# Patient Record
Sex: FEMALE | Race: WHITE | NOT HISPANIC OR LATINO
[De-identification: names, ages, dates, MRNs, and addresses within clinical notes are randomized per-mention and may not be internally consistent; named-entity substitution may affect disease eponyms.]

---

## 2020-07-27 ENCOUNTER — TRANSCRIPTION ENCOUNTER (OUTPATIENT)
Age: 25
End: 2020-07-27

## 2023-06-22 ENCOUNTER — APPOINTMENT (OUTPATIENT)
Dept: GYNECOLOGIC ONCOLOGY | Facility: CLINIC | Age: 28
End: 2023-06-22
Payer: COMMERCIAL

## 2023-06-22 VITALS
BODY MASS INDEX: 21.53 KG/M2 | HEIGHT: 62 IN | SYSTOLIC BLOOD PRESSURE: 144 MMHG | HEART RATE: 86 BPM | WEIGHT: 117 LBS | DIASTOLIC BLOOD PRESSURE: 88 MMHG

## 2023-06-22 VITALS
HEART RATE: 86 BPM | SYSTOLIC BLOOD PRESSURE: 144 MMHG | HEIGHT: 62 IN | WEIGHT: 117 LBS | DIASTOLIC BLOOD PRESSURE: 89 MMHG | BODY MASS INDEX: 21.53 KG/M2

## 2023-06-22 DIAGNOSIS — B97.7 PAPILLOMAVIRUS AS THE CAUSE OF DISEASES CLASSIFIED ELSEWHERE: ICD-10-CM

## 2023-06-22 DIAGNOSIS — Z80.3 FAMILY HISTORY OF MALIGNANT NEOPLASM OF BREAST: ICD-10-CM

## 2023-06-22 DIAGNOSIS — Z86.03 PERSONAL HISTORY OF NEOPLASM OF UNCERTAIN BEHAVIOR: ICD-10-CM

## 2023-06-22 DIAGNOSIS — Z80.8 FAMILY HISTORY OF MALIGNANT NEOPLASM OF OTHER ORGANS OR SYSTEMS: ICD-10-CM

## 2023-06-22 PROCEDURE — 57454 BX/CURETT OF CERVIX W/SCOPE: CPT

## 2023-06-22 PROCEDURE — 99203 OFFICE O/P NEW LOW 30 MIN: CPT | Mod: 25

## 2023-06-22 RX ORDER — NORETHINDRONE ACETATE AND ETHINYL ESTRADIOL AND FERROUS FUMARATE 1MG-20(24)
1-20 KIT ORAL
Refills: 0 | Status: ACTIVE | COMMUNITY

## 2023-06-22 RX ORDER — LISDEXAMFETAMINE DIMESYLATE 10 MG/1
CAPSULE ORAL
Refills: 0 | Status: ACTIVE | COMMUNITY

## 2023-06-22 NOTE — PROCEDURE
[Colposcopy] : colposcopy [Abnormal Pap] : an abnormal pap smear [HPV high risk] : PCR positive for high risk HPV [Patient] : the patient [Written Consent] : written consent was obtained prior to the procedure and is detailed in the patient's record [Allergies Reviewed] : Allergies reviewed [SCJ Fully Visualized] : the squamocolumnar junction was fully visualized [Acetowhite ___ o'clock] : ascetowhite changes at the [unfilled] ~Uo'clock position [Atypical Vessels ___ o'clock] : atypical vessels at the [unfilled] ~Uo'clock position [Punctation ___ o'clock] : punctation at the [unfilled] ~Uo'clock position [No Abnormalities] : no abnormalities seen [Biopsies Taken: # ___] : [unfilled] biopsies taken of the cervix [ECC Done] : an Endocervical curettage was performed.  [Silver Nitrate] : silver nitrate [No Complications] : none [Tolerated Well] : the patient tolerated the procedure well [Post procedure instructions and information given] : post procedure instructions and information given and reviewed with patient. [FreeTextEntry9] : IVANIA [FreeTextEntry3] : 5 o'clock lesion is more raised and c/w low grade dysplasia [FreeTextEntry1] : The colposcopy was adequate and she tolerated it well. She has a WE lesion encompasing the T-zone with a separate WE, raised lesion at 5 that I biopsied. The rest looked like low grade dysplasia.

## 2023-06-22 NOTE — HISTORY OF PRESENT ILLNESS
[FreeTextEntry1] : Referred by (GYN) Dr. Mccord\par PCP: Dr. Bejarano\par \par Ms. MARTINEZ is a 27 year old  female, LMP 23, referred from Dr. Mccord, for an abnormal pap smear.\par \par - Pap- negative\par 2022- Pap- ASCUS, HRHPV (+)\par 2023- Pap-  ASCU-H, HRHPV (+)\par No Colposcopy was performed \par \par PMHx- ADD\par PSHx- n/a\par Family hx of cancer- mother with melanoma, paternal grandmother and paternal aunt breast cancer\par Social hx- never smoker, social alcohol, lives with partner, planning on children in the next 3 years, she did not get the Gardasil vaccine and she is never smoker\par \par Of note, she was treated for Chlamydia, 3 years ago. She denies abnormal bleeding.  She denies intermenstrual bleeding, and post coital bleeding.  She reports no pelvic pain or pressure.  She denies any other associated signs or symptoms.  She is here today to discuss further management.  Outside of the above paps she has not had a prior abnormality.\par \par HM\par Pap- see above \par Mammo- n/a\par Colonoscopy- n/a

## 2023-06-22 NOTE — LETTER BODY
[Dear  ___] : Dear  [unfilled], [I had the pleasure of evaluating your patient, [unfilled] for ___] : I had the pleasure of evaluating your patient, [unfilled] for [unfilled]. [Attached please find my note.] : Attached please find my note. [FreeTextEntry2] : As you may recall, she is a young 27 y.o. woman with an ASC-H pap. I did colpo and biopsy that I suspect is low grade. she will return to see me in 4 weeks to discuss plans.

## 2023-06-22 NOTE — PHYSICAL EXAM
[Chaperone Present] : A chaperone was present in the examining room during all aspects of the physical examination [Normal] : Femoral pulses: 2+ bilaterally [Absent] : CVAT: absent [Fully active, able to carry on all pre-disease performance without restriction] : Status 0 - Fully active, able to carry on all pre-disease performance without restriction [FreeTextEntry1] : Antionette [de-identified] : normal ectropion, see colpo note

## 2023-06-22 NOTE — ASSESSMENT
[FreeTextEntry1] : The natural course and the effect of HPV infection was discussed. We discussed the findings of her colposcopy.  We would proceed with an excisional procedure, only if high grade dysplasia was found on biopsy, excising the cervical dysplasia and exclude occult early invasive carcinoma.\par \par Advised the patient of the natural course and effect of high risk HPV infection including this development of cervical dysplasia.  We discussed cigarette smoking and how it relates to LGT dz.  We discussed ASCCP guidelines, and the Gardasil vaccine recommendation.  If low grade dysplasia is found on biopsy, repeat pap smear with co testing, will be recommended.  All questions answered

## 2023-06-28 ENCOUNTER — OUTPATIENT (OUTPATIENT)
Dept: OUTPATIENT SERVICES | Facility: HOSPITAL | Age: 28
LOS: 1 days | End: 2023-06-28
Payer: COMMERCIAL

## 2023-06-28 DIAGNOSIS — C80.1 MALIGNANT (PRIMARY) NEOPLASM, UNSPECIFIED: ICD-10-CM

## 2023-06-29 ENCOUNTER — RESULT REVIEW (OUTPATIENT)
Age: 28
End: 2023-06-29

## 2023-06-29 PROCEDURE — 88321 CONSLTJ&REPRT SLD PREP ELSWR: CPT

## 2023-06-29 PROCEDURE — 88141 CYTOPATH C/V INTERPRET: CPT | Mod: 59

## 2023-07-18 LAB — CORE LAB BIOPSY: NORMAL

## 2023-08-03 ENCOUNTER — APPOINTMENT (OUTPATIENT)
Dept: GYNECOLOGIC ONCOLOGY | Facility: CLINIC | Age: 28
End: 2023-08-03
Payer: COMMERCIAL

## 2023-08-03 VITALS
SYSTOLIC BLOOD PRESSURE: 121 MMHG | DIASTOLIC BLOOD PRESSURE: 76 MMHG | WEIGHT: 117 LBS | HEART RATE: 87 BPM | BODY MASS INDEX: 21.53 KG/M2 | HEIGHT: 62 IN

## 2023-08-03 DIAGNOSIS — R87.611 ATYPICAL SQUAMOUS CELLS CANNOT EXCLUDE HIGH GRADE SQUAMOUS INTRAEPITHELIAL LESION ON CYTOLOGIC SMEAR OF CERVIX (ASC-H): ICD-10-CM

## 2023-08-03 PROCEDURE — 99213 OFFICE O/P EST LOW 20 MIN: CPT

## 2023-08-03 NOTE — ASSESSMENT
[FreeTextEntry1] : The natural course and the effect of HPV infection was discussed. We discussed the findings of her colposcopy and the biopsies as well. She has KAREN 2 of the ectocervix with negative ECC.  We discussed the natural course and effect of high risk HPV infection including this development of cervical dysplasia and cancer.  We discussed cigarette smoking and how it relates to LGT dz.  We discussed ASCCP guidelines, and the Gardasil vaccine recommendation.  With KAREN 2 an dnegative ecc her options include expectant management with observation to see if she can clear the lesion given her young age vs. a laser ablation of the lesion as that is the least invasive procedure and will give her the lowest risk of cervical incompetence. She would like to observe it but will think about it more and get back to me. All her questions were answered. She will call and let me know her final plans.

## 2023-08-03 NOTE — PHYSICAL EXAM
[Chaperone Present] : A chaperone was present in the examining room during all aspects of the physical examination [Absent] : CVAT: absent [Normal] : Anus and perineum: Normal sphincter tone, no masses, no prolapse. [Fully active, able to carry on all pre-disease performance without restriction] : Status 0 - Fully active, able to carry on all pre-disease performance without restriction [FreeTextEntry1] : Antionette [de-identified] : normal ectropion, see colpo note

## 2023-08-03 NOTE — PHYSICAL EXAM
[Chaperone Present] : A chaperone was present in the examining room during all aspects of the physical examination [Absent] : CVAT: absent [Normal] : Anus and perineum: Normal sphincter tone, no masses, no prolapse. [Fully active, able to carry on all pre-disease performance without restriction] : Status 0 - Fully active, able to carry on all pre-disease performance without restriction [FreeTextEntry1] : Antionette [de-identified] : normal ectropion, see colpo note

## 2023-08-03 NOTE — HISTORY OF PRESENT ILLNESS
[FreeTextEntry1] : Referred by (GYN) Dr. Mccord PCP: Dr. Bejarano  Ms. MARTINEZ is a 27 year old  female, LMP 23, initially referred from Dr. Mccord, for an abnormal pap smear as follows: - Pap- negative 2022- Pap- ASCUS, HRHPV (+) 2023- Pap-  ASCU-H, HRHPV (+) 2023- Colposcopy- 5 oclock cervical biopsy- CIN2,  ECC- negative  Of note, she was treated for Chlamydia, 3 years ago. She denies abnormal bleeding.  She denies intermenstrual bleeding, and post coital bleeding.  She reports no pelvic pain or pressure.  She denies any other associated signs or symptoms.  She is here today to discuss further management.  Outside of the above paps she has not had a prior abnormality.  PMHx- ADD PSHx- n/a Family hx of cancer- mother with melanoma, paternal grandmother and paternal aunt breast cancer Social hx- never smoker, social alcohol, lives with partner, planning on children in the next 3 years, she did not get the Gardasil vaccine and she is never smoker  HM Pap- see above  Mammo- n/a Colonoscopy- n/a

## 2023-08-03 NOTE — LETTER BODY
[Dear  ___] : Dear  [unfilled], [I had the pleasure of evaluating your patient, [unfilled] for ___] : I had the pleasure of evaluating your patient, [unfilled] for [unfilled]. [Attached please find my note.] : Attached please find my note. [FreeTextEntry2] : As you may recall, she is a young 27 y.o. woman with an ASC-H pap. I did colpo and biopsy that revealed KAREN 2 with a negative ECC. She is leaning towards observation in leiu of treatment to see if she can clear the lesion. This is reasonable and I will follow her with you.

## 2023-09-25 ENCOUNTER — TRANSCRIPTION ENCOUNTER (OUTPATIENT)
Age: 28
End: 2023-09-25

## 2023-11-13 ENCOUNTER — OUTPATIENT (OUTPATIENT)
Dept: OUTPATIENT SERVICES | Facility: HOSPITAL | Age: 28
LOS: 1 days | End: 2023-11-13

## 2023-11-13 VITALS
SYSTOLIC BLOOD PRESSURE: 130 MMHG | RESPIRATION RATE: 16 BRPM | DIASTOLIC BLOOD PRESSURE: 80 MMHG | OXYGEN SATURATION: 99 % | HEART RATE: 62 BPM | HEIGHT: 61.5 IN | WEIGHT: 119.05 LBS | TEMPERATURE: 98 F

## 2023-11-13 DIAGNOSIS — N87.1 MODERATE CERVICAL DYSPLASIA: ICD-10-CM

## 2023-11-13 DIAGNOSIS — Z96.22 MYRINGOTOMY TUBE(S) STATUS: Chronic | ICD-10-CM

## 2023-11-13 DIAGNOSIS — K08.409 PARTIAL LOSS OF TEETH, UNSPECIFIED CAUSE, UNSPECIFIED CLASS: Chronic | ICD-10-CM

## 2023-11-13 LAB
HCG SERPL-ACNC: <1 MIU/ML — SIGNIFICANT CHANGE UP
HCG SERPL-ACNC: <1 MIU/ML — SIGNIFICANT CHANGE UP
HCT VFR BLD CALC: 40.5 % — SIGNIFICANT CHANGE UP (ref 34.5–45)
HCT VFR BLD CALC: 40.5 % — SIGNIFICANT CHANGE UP (ref 34.5–45)
HGB BLD-MCNC: 13.6 G/DL — SIGNIFICANT CHANGE UP (ref 11.5–15.5)
HGB BLD-MCNC: 13.6 G/DL — SIGNIFICANT CHANGE UP (ref 11.5–15.5)
MCHC RBC-ENTMCNC: 32.5 PG — SIGNIFICANT CHANGE UP (ref 27–34)
MCHC RBC-ENTMCNC: 32.5 PG — SIGNIFICANT CHANGE UP (ref 27–34)
MCHC RBC-ENTMCNC: 33.6 GM/DL — SIGNIFICANT CHANGE UP (ref 32–36)
MCHC RBC-ENTMCNC: 33.6 GM/DL — SIGNIFICANT CHANGE UP (ref 32–36)
MCV RBC AUTO: 96.7 FL — SIGNIFICANT CHANGE UP (ref 80–100)
MCV RBC AUTO: 96.7 FL — SIGNIFICANT CHANGE UP (ref 80–100)
NRBC # BLD: 0 /100 WBCS — SIGNIFICANT CHANGE UP (ref 0–0)
NRBC # BLD: 0 /100 WBCS — SIGNIFICANT CHANGE UP (ref 0–0)
NRBC # FLD: 0 K/UL — SIGNIFICANT CHANGE UP (ref 0–0)
NRBC # FLD: 0 K/UL — SIGNIFICANT CHANGE UP (ref 0–0)
PLATELET # BLD AUTO: 373 K/UL — SIGNIFICANT CHANGE UP (ref 150–400)
PLATELET # BLD AUTO: 373 K/UL — SIGNIFICANT CHANGE UP (ref 150–400)
RBC # BLD: 4.19 M/UL — SIGNIFICANT CHANGE UP (ref 3.8–5.2)
RBC # BLD: 4.19 M/UL — SIGNIFICANT CHANGE UP (ref 3.8–5.2)
RBC # FLD: 12.2 % — SIGNIFICANT CHANGE UP (ref 10.3–14.5)
RBC # FLD: 12.2 % — SIGNIFICANT CHANGE UP (ref 10.3–14.5)
WBC # BLD: 6.76 K/UL — SIGNIFICANT CHANGE UP (ref 3.8–10.5)
WBC # BLD: 6.76 K/UL — SIGNIFICANT CHANGE UP (ref 3.8–10.5)
WBC # FLD AUTO: 6.76 K/UL — SIGNIFICANT CHANGE UP (ref 3.8–10.5)
WBC # FLD AUTO: 6.76 K/UL — SIGNIFICANT CHANGE UP (ref 3.8–10.5)

## 2023-11-13 RX ORDER — SODIUM CHLORIDE 9 MG/ML
1000 INJECTION, SOLUTION INTRAVENOUS
Refills: 0 | Status: DISCONTINUED | OUTPATIENT
Start: 2023-11-20 | End: 2023-12-04

## 2023-11-13 NOTE — H&P PST ADULT - ATTENDING COMMENTS
Patient seen, history reviewed, and informed consent obtained. She is aware that trainees may participate in her care.

## 2023-11-13 NOTE — H&P PST ADULT - PROBLEM SELECTOR PLAN 1
Patient tentatively scheduled for laser ablation of cervix and colposcopy on 11/20/23.  Pre-op instructions provided. Pt given verbal and written instructions with teach back on  pepcid. Pt verbalized understanding with return demonstration.   Urine cup provided for day of procedure for pregnancy test.

## 2023-11-13 NOTE — H&P PST ADULT - HISTORY OF PRESENT ILLNESS
28 year old female with PMH of ADHD, presents to Presurgical testing with diagnosis of  Moderate cervical dysplasia scheduled for laser ablation of cervix and colposcopy

## 2023-11-17 NOTE — ASU PATIENT PROFILE, ADULT - FALL HARM RISK - UNIVERSAL INTERVENTIONS
Bed in lowest position, wheels locked, appropriate side rails in place/Call bell, personal items and telephone in reach/Instruct patient to call for assistance before getting out of bed or chair/Non-slip footwear when patient is out of bed/Felda to call system/Physically safe environment - no spills, clutter or unnecessary equipment/Purposeful Proactive Rounding/Room/bathroom lighting operational, light cord in reach

## 2023-11-19 ENCOUNTER — TRANSCRIPTION ENCOUNTER (OUTPATIENT)
Age: 28
End: 2023-11-19

## 2023-11-20 ENCOUNTER — TRANSCRIPTION ENCOUNTER (OUTPATIENT)
Age: 28
End: 2023-11-20

## 2023-11-20 ENCOUNTER — APPOINTMENT (OUTPATIENT)
Dept: GYNECOLOGIC ONCOLOGY | Facility: HOSPITAL | Age: 28
End: 2023-11-20

## 2023-11-20 ENCOUNTER — OUTPATIENT (OUTPATIENT)
Dept: OUTPATIENT SERVICES | Facility: HOSPITAL | Age: 28
LOS: 1 days | Discharge: HOME | End: 2023-11-20
Payer: COMMERCIAL

## 2023-11-20 VITALS
HEART RATE: 77 BPM | TEMPERATURE: 99 F | SYSTOLIC BLOOD PRESSURE: 142 MMHG | DIASTOLIC BLOOD PRESSURE: 78 MMHG | RESPIRATION RATE: 14 BRPM | WEIGHT: 119.05 LBS | OXYGEN SATURATION: 98 % | HEIGHT: 61.5 IN

## 2023-11-20 VITALS
OXYGEN SATURATION: 100 % | SYSTOLIC BLOOD PRESSURE: 121 MMHG | DIASTOLIC BLOOD PRESSURE: 76 MMHG | RESPIRATION RATE: 18 BRPM | HEART RATE: 74 BPM

## 2023-11-20 DIAGNOSIS — Z96.22 MYRINGOTOMY TUBE(S) STATUS: Chronic | ICD-10-CM

## 2023-11-20 DIAGNOSIS — N87.1 MODERATE CERVICAL DYSPLASIA: ICD-10-CM

## 2023-11-20 DIAGNOSIS — K08.409 PARTIAL LOSS OF TEETH, UNSPECIFIED CAUSE, UNSPECIFIED CLASS: Chronic | ICD-10-CM

## 2023-11-20 LAB
HCG UR QL: NEGATIVE — SIGNIFICANT CHANGE UP
HCG UR QL: NEGATIVE — SIGNIFICANT CHANGE UP

## 2023-11-20 PROCEDURE — 57513 LASER SURGERY OF CERVIX: CPT

## 2023-11-20 RX ORDER — NORETHINDRONE AND ETHINYL ESTRADIOL 0.4-0.035
1 KIT ORAL
Refills: 0 | DISCHARGE

## 2023-11-20 RX ORDER — FENTANYL CITRATE 50 UG/ML
50 INJECTION INTRAVENOUS
Refills: 0 | Status: DISCONTINUED | OUTPATIENT
Start: 2023-11-20 | End: 2023-11-20

## 2023-11-20 RX ORDER — FENTANYL CITRATE 50 UG/ML
25 INJECTION INTRAVENOUS
Refills: 0 | Status: DISCONTINUED | OUTPATIENT
Start: 2023-11-20 | End: 2023-11-20

## 2023-11-20 RX ORDER — SODIUM CHLORIDE 9 MG/ML
1000 INJECTION, SOLUTION INTRAVENOUS
Refills: 0 | Status: DISCONTINUED | OUTPATIENT
Start: 2023-11-20 | End: 2023-12-04

## 2023-11-20 RX ORDER — ONDANSETRON 8 MG/1
4 TABLET, FILM COATED ORAL ONCE
Refills: 0 | Status: DISCONTINUED | OUTPATIENT
Start: 2023-11-20 | End: 2023-12-04

## 2023-11-20 NOTE — ASU DISCHARGE PLAN (ADULT/PEDIATRIC) - NURSING INSTRUCTIONS
DO NOT take any Tylenol (Acetaminophen) or narcotics containing Tylenol until after 2:45 PM. You received Tylenol during your operation and it can cause damage to your liver if too much is taken within a 24 hour time period.

## 2023-11-20 NOTE — ASU DISCHARGE PLAN (ADULT/PEDIATRIC) - CARE PROVIDER_API CALL
Berlin Pearson Betito  Gynecologic Oncology  [address] [place] [address]  Phone: [phone]  Fax: [phone]  Scheduled Appointment: 12/22/2023 11:00 AM

## 2023-11-20 NOTE — BRIEF OPERATIVE NOTE - NSICDXBRIEFPROCEDURE_GEN_ALL_CORE_FT
PROCEDURES:  Colposcopy, cervix 20-Nov-2023 08:55:06  Lisa Paula  Ablation, cervix, using CO2 laser 20-Nov-2023 08:55:19  Lisa Paula

## 2023-11-20 NOTE — ASU DISCHARGE PLAN (ADULT/PEDIATRIC) - ASU DC SPECIAL INSTRUCTIONSFT
Postoperative Instructions      Pain control     For pain control, take the followin. Motrin 600mg four times a day (every 6 hours), take with food  2. Add Tylenol 975mg four times a day (every 6 hours), alternated with motrin  Motrin and Tylenol can be obtained over the counter.      Postoperative restrictions   Do not drive or make important decisions for 24 hours after anesthesia. You may feel drowsy for 24 hours and should have a responsible adult with you during that time. Nothing in the vagina (tampons, sexual intercourse), No tub baths, pools or hot tubs for 2 weeks (showers are ok!).      Vaginal bleeding   Spotting per vagina is normal in first few days after surgery. If you are soaking 1 pad per hour, that is not normal and you should notify your doctor's office and seek medical attention right away.        Signs of Infection  Some postoperative pain and discomfort is normal. However, if you experience any of the following, you may be developing an infection and should be seen by your doctor: pain that does not get better with the oral medications listed above, fever greater than 100.4F, foul smelling discharge coming from the surgical site, nausea and vomiting that does not stop (especially if you are unable to tolerate oral intake), or inability to urinate. If you experience any of these symptoms, call your doctor's office to be seen right away.    Follow Up  Call your doctor's office to schedule a postoperative appointment on 23. The results from the procedure will be discussed with you at that time. Postoperative Instructions      Pain control     For pain control, take the followin. Motrin 600mg four times a day (every 6 hours), take with food  2. Add Tylenol 975mg four times a day (every 6 hours), alternated with motrin  Motrin and Tylenol can be obtained over the counter.      Postoperative restrictions   Do not drive or make important decisions for 24 hours after anesthesia. You may feel drowsy for 24 hours and should have a responsible adult with you during that time. Nothing in the vagina (tampons, sexual intercourse), No tub baths, pools or hot tubs for 4 weeks (showers are ok!).      Vaginal bleeding   Spotting per vagina is normal in first few days after surgery. If you are soaking 1 pad per hour, that is not normal and you should notify your doctor's office and seek medical attention right away.        Signs of Infection  Some postoperative pain and discomfort is normal. However, if you experience any of the following, you may be developing an infection and should be seen by your doctor: pain that does not get better with the oral medications listed above, fever greater than 100.4F, foul smelling discharge coming from the surgical site, nausea and vomiting that does not stop (especially if you are unable to tolerate oral intake), or inability to urinate. If you experience any of these symptoms, call your doctor's office to be seen right away.    Follow Up  Call your doctor's office to schedule a postoperative appointment on 23. The results from the procedure will be discussed with you at that time. Postoperative Instructions      Pain control     For pain control, take the followin. Motrin 600mg four times a day (every 6 hours), take with food  2. Add Tylenol 975mg four times a day (every 6 hours), alternated with Motrin  Motrin and Tylenol can be obtained over the counter.      Postoperative restrictions   Do not drive or make important decisions for 24 hours after anesthesia. You may feel drowsy for 24 hours and should have a responsible adult with you during that time. Nothing in the vagina (tampons, sexual intercourse), No tub baths, pools or hot tubs for 4 weeks (showers are ok!).      Vaginal bleeding   Spotting per vagina is normal in first few days after surgery. If you are soaking 1 pad per hour, that is not normal and you should notify your doctor's office and seek medical attention right away.        Signs of Infection  Some postoperative pain and discomfort is normal. However, if you experience any of the following, you may be developing an infection and should be seen by your doctor: pain that does not get better with the oral medications listed above, fever greater than 100.4F, foul smelling discharge coming from the surgical site, nausea and vomiting that does not stop (especially if you are unable to tolerate oral intake), or inability to urinate. If you experience any of these symptoms, call your doctor's office to be seen right away.    Follow Up  Call your doctor's office to schedule a postoperative appointment on 23. The results from the procedure will be discussed with you at that time.

## 2023-11-20 NOTE — ASU DISCHARGE PLAN (ADULT/PEDIATRIC) - MEDICATION INSTRUCTIONS
ibuprofen 600mg every 6 hours; acetaminophen 975mg every 6 hours Ibuprofen 600mg every 6 hours; Acetaminophen 975mg every 6 hours

## 2023-11-20 NOTE — BRIEF OPERATIVE NOTE - OPERATION/FINDINGS
Speculum exam revealed dense acetowhite uptake from 4 to 8 o'clock, mosaic changes with cobblestone appearance from 9 to 11 o'clock, and subtle white epithelium visible from 1 to 3 o'clock.   Minimal bleeding noted at end of procedure.

## 2023-11-20 NOTE — ASU DISCHARGE PLAN (ADULT/PEDIATRIC) - CALL YOUR DOCTOR IF YOU HAVE ANY OF THE FOLLOWING:
passing large blood clots vaginally or saturating a peripad over 1 hour x 2 hours/Bleeding that does not stop/Pain not relieved by Medications/Fever greater than (need to indicate Fahrenheit or Celsius)/Wound/Surgical Site with redness, or foul smelling discharge or pus/Nausea and vomiting that does not stop/Unable to urinate

## 2023-11-22 ENCOUNTER — NON-APPOINTMENT (OUTPATIENT)
Age: 28
End: 2023-11-22

## 2023-12-15 ENCOUNTER — APPOINTMENT (OUTPATIENT)
Dept: GYNECOLOGIC ONCOLOGY | Facility: CLINIC | Age: 28
End: 2023-12-15
Payer: COMMERCIAL

## 2023-12-15 VITALS
BODY MASS INDEX: 21.63 KG/M2 | WEIGHT: 118.25 LBS | HEART RATE: 71 BPM | TEMPERATURE: 98.1 F | DIASTOLIC BLOOD PRESSURE: 82 MMHG | SYSTOLIC BLOOD PRESSURE: 122 MMHG

## 2023-12-15 PROBLEM — F90.9 ATTENTION-DEFICIT HYPERACTIVITY DISORDER, UNSPECIFIED TYPE: Chronic | Status: ACTIVE | Noted: 2023-11-13

## 2023-12-15 PROCEDURE — 99212 OFFICE O/P EST SF 10 MIN: CPT

## 2023-12-15 NOTE — DISCUSSION/SUMMARY
[None] : had no drainage [Normal Skin] : normal appearance [Normal Skin Turgor] : normal skin turgor [Firm] : soft [Doing Well] : is doing well [Excellent Pain Control] : has excellent pain control [No Sign of Infection] : is showing no signs of infection [de-identified] : healing cervix no evidence of infection [de-identified] : /GI function wb=nl [de-identified] : postoperative recuperation discussed [FreeTextEntry1] : High grade cervical dysplasia RTO in 6 months for repeat evaluation with Dr. Pearson

## 2023-12-15 NOTE — REASON FOR VISIT
[Post Op] : post op visit [de-identified] : 11/20/2023 [de-identified] : Colposcopy of the cervix, laser ablation [de-identified] : Normal bowel function. Normal bladder function. No vaginal bleeding or malodorous discharge. No fevers/chills.  Recovering well.

## 2024-02-06 NOTE — PHYSICAL EXAM
[Chaperone Present] : A chaperone was present in the examining room during all aspects of the physical examination [FreeTextEntry1] : Antionette [Absent] : CVAT: absent [Normal] : Anus and perineum: Normal sphincter tone, no masses, no prolapse. [de-identified] : normal ectropion, see colpo note [Fully active, able to carry on all pre-disease performance without restriction] : Status 0 - Fully active, able to carry on all pre-disease performance without restriction

## 2024-02-06 NOTE — HISTORY OF PRESENT ILLNESS
[FreeTextEntry1] : Referred by (GYN) Dr. Mccord PCP: Dr. Bejarano  Ms. MARTINEZ is a 27 year old  female, LMP 23, initially referred from Dr. Mccord, for an abnormal pap smear as follows: - Pap- negative 2022- Pap- ASCUS, HRHPV (+) 2023- Pap-  ASCU-H, HRHPV (+) 2023- Colposcopy- 5 oclock cervical biopsy- CIN2,  ECC- negative 2023 - Colposcopy of the cervix, laser ablation   Postop apt was discussed to return in 6 months  PMHx- ADD PSHx- n/a Family hx of cancer- mother with melanoma, paternal grandmother and paternal aunt breast cancer Social hx- never smoker, social alcohol, lives with partner, planning on children in the next 3 years, she did not get the Gardasil vaccine and she is never smoker  HM Pap- see above  Mammo- n/a Colonoscopy- n/a

## 2024-02-08 ENCOUNTER — APPOINTMENT (OUTPATIENT)
Dept: GYNECOLOGIC ONCOLOGY | Facility: CLINIC | Age: 29
End: 2024-02-08

## 2024-05-15 PROBLEM — N87.1 CIN II (CERVICAL INTRAEPITHELIAL NEOPLASIA II): Status: ACTIVE | Noted: 2023-08-03

## 2024-05-16 ENCOUNTER — APPOINTMENT (OUTPATIENT)
Dept: GYNECOLOGIC ONCOLOGY | Facility: CLINIC | Age: 29
End: 2024-05-16
Payer: COMMERCIAL

## 2024-05-16 VITALS
SYSTOLIC BLOOD PRESSURE: 132 MMHG | BODY MASS INDEX: 21.71 KG/M2 | WEIGHT: 118 LBS | HEIGHT: 62 IN | HEART RATE: 82 BPM | DIASTOLIC BLOOD PRESSURE: 84 MMHG

## 2024-05-16 DIAGNOSIS — N87.1 MODERATE CERVICAL DYSPLASIA: ICD-10-CM

## 2024-05-16 PROCEDURE — 99212 OFFICE O/P EST SF 10 MIN: CPT

## 2024-05-16 PROCEDURE — 99459 PELVIC EXAMINATION: CPT

## 2024-05-16 NOTE — ASSESSMENT
[FreeTextEntry1] : 28 y.o. woman with a history of KAREN 2 with a negative ECC s/p laser ablation of cervix in November of 2023. She looks well today and I would recommend a repeat pap in 6 months. She will come here for her pap.

## 2024-05-16 NOTE — LETTER BODY
Ok to refill. Next appt needs to be with DR. Patrick he has not seen him in quite sometime. Change appt from me in Dec to RS.    [Dear  ___] : Dear  [unfilled], [I had the pleasure of evaluating your patient, [unfilled] for ___] : I had the pleasure of evaluating your patient, [unfilled] for [unfilled]. [Attached please find my note.] : Attached please find my note. [FreeTextEntry2] : As you may recall, she is a young 27 y.o. woman with an ASC-H pap. I did colpo and biopsy that revealed KAREN 2 with a negative ECC. She had a laser ablation of the cervix and she recovered well. On exam today her cervix has healed nicely and her T-zone is visible, that's why I did the laser. She is planning to see me in 6 months to repeat her pap and i will let you konw the results when I get them.

## 2024-05-16 NOTE — PHYSICAL EXAM
[Chaperone Present] : A chaperone was present in the examining room during all aspects of the physical examination [[id number]] : A chaperone was present during the pelvic exam. [Absent] : CVAT: absent [Normal] : Bimanual Exam: Normal [Fully active, able to carry on all pre-disease performance without restriction] : Status 0 - Fully active, able to carry on all pre-disease performance without restriction [de-identified] : soft, NT, ND with no abnormalities [de-identified] : t-zone completely visible, very posterior cervix [de-identified] : small and mobile

## 2024-05-16 NOTE — HISTORY OF PRESENT ILLNESS
[FreeTextEntry1] : Referred by (GYN) Dr. Mccord PCP: Dr. Bejarano  Ms. MARTINEZ is a 27 year old  female, LMP 23, initially referred from Dr. Mccord, for an abnormal pap smear as follows: - Pap- negative 2022- Pap- ASCUS, HRHPV (+) 2023- Pap-  ASCU-H, HRHPV (+) 2023- Colposcopy- 5 oclock cervical biopsy- CIN2,  ECC- negative 2023- Laser ablation  Of note, she was treated for Chlamydia, 3 years ago. She denies abnormal bleeding.  She denies intermenstrual bleeding, and post coital bleeding.  She reports no pelvic pain or pressure.  She denies any other associated signs or symptoms.  She had the laser ablation of cervix in 2023 and is here for a follow up. She feels well and reports light and regular periods on OCPs. She denies any irregular vaginal bleeding or discharge.   PMHx- ADD PSHx- n/a Family hx of cancer- mother with melanoma, paternal grandmother and paternal aunt breast cancer Social hx- never smoker, social alcohol, lives with partner, planning on children in the next 3 years, she did not get the Gardasil vaccine and she is never smoker  HM Pap- see above  Mammo- n/a Colonoscopy- n/a

## 2024-06-13 ENCOUNTER — APPOINTMENT (OUTPATIENT)
Dept: GYNECOLOGIC ONCOLOGY | Facility: CLINIC | Age: 29
End: 2024-06-13

## 2024-07-22 NOTE — H&P PST ADULT - BP NONINVASIVE SYSTOLIC (MM HG)
Catarina Moody is a 67 year old female presenting for   Chief Complaint   Patient presents with    Office Visit    Establish Care     Work/School:  working part time at [employer] Lives with:   Feels safe:  safe     Number of children:   3 boys   3 girls  Last seen by:  Dr. Flaherty around 11/3/23     Exercise: walk     Diet:  Do you eat 3 meals in a day?: yes  What does pt typically have for-(see below)  Breakfast: cottage cheese and peaches  Lunch: sandwich/fruit  Supper: meat/veggies  Normally drinks (ex: water, soda, coffee): milk, water, soda a day  Amount of water for one day: 2 bottles     Screening:   Personal history of MI/Stroke/heart disease:  none  Personal history of cancer:  none  Last colonoscopy:  5/17/2019  Last mammogram: 6/21/24  PAP: 12/30/2016  Taking VitD/Ca: both supplements    Dental care: utd   Ophthalmology: utd  tobacco or drugs: none  Alcohol use: none    Questions/Concerns: none    Preferred method of results communication: LiveWell    Denies Latex allergy or sensitivity.    Medication verified and med list updated  Refills needed today: No       Health Maintenance       Shingles Vaccine (1 of 2)  Never done    COVID-19 Vaccine (3 - Pfizer risk series)  Overdue since 7/20/2021    Medicare Advantage- Medicare Wellness Visit (Yearly - January to December)  Overdue since 1/1/2024    Colorectal Cancer Risk - Colonoscopy (Every 5 Years)  Due since 5/17/2024           Following review of the above:  Patient is not proceeding with: COVID-19 and Shingles    Note: Refer to final orders and clinician documentation.          Depression Screening:  Review Flowsheet  More data exists         3/14/2024   PHQ 2/9 Score   Adult PHQ 2 Score 0   Adult PHQ 2 Interpretation No further screening needed   Little interest or pleasure in activity? Not at all   Feeling down, depressed or hopeless? Not at all      Details                     130

## 2024-11-14 ENCOUNTER — APPOINTMENT (OUTPATIENT)
Dept: GYNECOLOGIC ONCOLOGY | Facility: CLINIC | Age: 29
End: 2024-11-14
Payer: COMMERCIAL

## 2024-11-14 ENCOUNTER — NON-APPOINTMENT (OUTPATIENT)
Age: 29
End: 2024-11-14

## 2024-11-14 VITALS
SYSTOLIC BLOOD PRESSURE: 127 MMHG | DIASTOLIC BLOOD PRESSURE: 86 MMHG | BODY MASS INDEX: 21.16 KG/M2 | HEIGHT: 62 IN | OXYGEN SATURATION: 99 % | WEIGHT: 115 LBS | TEMPERATURE: 98 F | HEART RATE: 67 BPM

## 2024-11-14 PROCEDURE — 99213 OFFICE O/P EST LOW 20 MIN: CPT

## 2024-11-14 PROCEDURE — 99459 PELVIC EXAMINATION: CPT

## 2024-11-14 NOTE — PHYSICAL EXAM
[Chaperone Present] : A chaperone was present in the examining room during all aspects of the physical examination [Absent] : CVAT: absent [Normal] : Bimanual Exam: Normal [Fully active, able to carry on all pre-disease performance without restriction] : Status 0 - Fully active, able to carry on all pre-disease performance without restriction [[id number]] : A chaperone was present during the pelvic exam. [FreeTextEntry2] : Racquel [de-identified] : soft, NT, ND with no abnormalities [de-identified] : t-zone completely visible, very posterior cervix. Pap done [de-identified] : small and mobile

## 2024-11-14 NOTE — ASSESSMENT
[FreeTextEntry1] : 29 y.o. woman with a history of SHEY 2 with a negative ECC s/p laser ablation of cervix in November of 2023. She looks well today and I repeated her pap today. If it is normal, she can return in one year for a final annual pap. If abnormal, and mild I will repeat it in 6 months, if abnormal and severe I would bring her back for colpo. She expressed understanding and agrees.  patient

## 2024-11-14 NOTE — REVIEW OF SYSTEMS
Pt said his leg is somewhat better, but is still there, asking if he could get another round of prednisolone? [Negative] : Musculoskeletal [FreeTextEntry5] : She has low B12 / Folate

## 2024-11-14 NOTE — LETTER BODY
[Dear  ___] : Dear  [unfilled], [I had the pleasure of evaluating your patient, [unfilled] for ___] : I had the pleasure of evaluating your patient, [unfilled] for [unfilled]. [Attached please find my note.] : Attached please find my note. [I recently saw our patient [unfilled] for a follow-up visit.] : I recently saw our patient, [unfilled] for a follow-up visit. [FreeTextEntry2] : As you may recall, she is a young 29 y.o. woman with an ASC-H pap. We previously diagnosed her with SHEY 2 and I did a laser ablation. She looks great and I just repeated her pap test today. I was happy to see that she had a normal transformation zone. My plan is to repeat her pap in one more year if this is normal and then send her back to you.

## 2024-11-14 NOTE — HISTORY OF PRESENT ILLNESS
[FreeTextEntry1] : Referred by (GYN) Dr. Jj PCP: Dr. Sierra  Ms. KNOWLES is a 29 year old  female, LMP 24, initially seen at the request of Dr. Jj, for an abnormal pap smear as follows: - Pap- negative 2022- Pap- ASCUS, HRHPV (+) 2023- Pap-  ASCU-H, HRHPV (+) 2023- Colposcopy- 5 oclock cervical biopsy- CIN2,  ECC- negative 2023- Laser ablation  She is here for a follow up visit. She denies abnormal bleeding and her LMP was 24.  She denies intermenstrual bleeding, and post coital bleeding.  She reports no pelvic pain or pressure.  She denies any other associated signs or symptoms.  She had the laser ablation of cervix in 2023 and is here for a follow up. She feels well and reports light and regular periods on OCPs. She denies any irregular vaginal bleeding or discharge.   PMHx- ADD PSHx- n/a Family hx of cancer- mother with melanoma, paternal grandmother and paternal aunt breast cancer Social hx- never smoker, social alcohol, lives with partner, planning on children in the next 3 years, she did not get the Gardasil vaccine and she is never smoker  HM Pap- see above  Mammo- n/a Colonoscopy- n/a

## 2024-11-20 LAB — CYTOLOGY CVX/VAG DOC THIN PREP: NORMAL

## (undated) DEVICE — DRSG TELFA 3 X 8

## (undated) DEVICE — POSITIONER PINK PAD PIGAZZI SYSTEM

## (undated) DEVICE — DRAPE TOWEL BLUE 17" X 24"

## (undated) DEVICE — GLV 6.5 PROTEXIS W HYDROGEL

## (undated) DEVICE — TONGUE DEPRESSOR

## (undated) DEVICE — VISITEC 4X4

## (undated) DEVICE — PACK DOUBLE BASIN CUSTOM

## (undated) DEVICE — GLV 7 PROTEXIS (BLUE)

## (undated) DEVICE — PACK PERI GYN

## (undated) DEVICE — WARMING BLANKET FULL ADULT

## (undated) DEVICE — LABELS BLANK W PEN

## (undated) DEVICE — GOWN LG

## (undated) DEVICE — ELCTR BOVIE PENCIL SMOKE EVACUATION

## (undated) DEVICE — VENODYNE/SCD SLEEVE CALF MEDIUM